# Patient Record
Sex: FEMALE | Race: BLACK OR AFRICAN AMERICAN | Employment: PART TIME | URBAN - METROPOLITAN AREA
[De-identification: names, ages, dates, MRNs, and addresses within clinical notes are randomized per-mention and may not be internally consistent; named-entity substitution may affect disease eponyms.]

---

## 2017-03-30 PROBLEM — O09.529 AMA (ADVANCED MATERNAL AGE) MULTIGRAVIDA 35+: Status: ACTIVE | Noted: 2017-03-30

## 2017-03-30 PROBLEM — Z64.1 MULTIGRAVIDA: Status: ACTIVE | Noted: 2017-03-30

## 2017-05-30 PROBLEM — O26.899 RH NEGATIVE STATE IN ANTEPARTUM PERIOD: Status: ACTIVE | Noted: 2017-05-30

## 2017-05-30 PROBLEM — Z67.91 RH NEGATIVE STATE IN ANTEPARTUM PERIOD: Status: ACTIVE | Noted: 2017-05-30

## 2017-06-02 ENCOUNTER — HOSPITAL ENCOUNTER (OUTPATIENT)
Dept: LAB | Age: 37
Discharge: HOME | End: 2017-06-02
Payer: COMMERCIAL

## 2017-06-02 DIAGNOSIS — Z67.91 RH NEGATIVE STATE IN ANTEPARTUM PERIOD, SECOND TRIMESTER: ICD-10-CM

## 2017-06-02 DIAGNOSIS — O26.892 RH NEGATIVE STATE IN ANTEPARTUM PERIOD, SECOND TRIMESTER: ICD-10-CM

## 2017-06-02 DIAGNOSIS — O99.810 ABNORMAL GLUCOSE AFFECTING PREGNANCY: ICD-10-CM

## 2017-06-02 LAB
BLOOD GROUP ANTIBODIES SERPL: NORMAL
GLUCOSE P FAST SERPL-MCNC: 89 MG/DL
GLUCOSE, 1 HR,GTT1: 123 MG/DL
GLUCOSE, 2 HR,GTT2: 166 MG/DL
GLUCOSE, 3 HR,GTT3: 160 MG/DL

## 2017-06-02 PROCEDURE — 82951 GLUCOSE TOLERANCE TEST (GTT): CPT | Performed by: OBSTETRICS & GYNECOLOGY

## 2017-06-02 PROCEDURE — 82947 ASSAY GLUCOSE BLOOD QUANT: CPT | Performed by: OBSTETRICS & GYNECOLOGY

## 2017-06-02 PROCEDURE — 86850 RBC ANTIBODY SCREEN: CPT | Performed by: OBSTETRICS & GYNECOLOGY

## 2017-06-02 PROCEDURE — 36415 COLL VENOUS BLD VENIPUNCTURE: CPT | Performed by: OBSTETRICS & GYNECOLOGY

## 2017-06-02 PROCEDURE — 82952 GTT-ADDED SAMPLES: CPT | Performed by: OBSTETRICS & GYNECOLOGY

## 2017-06-13 ENCOUNTER — HOSPITAL ENCOUNTER (EMERGENCY)
Age: 37
Discharge: HOME | End: 2017-06-13
Attending: OBSTETRICS & GYNECOLOGY | Admitting: OBSTETRICS & GYNECOLOGY
Payer: COMMERCIAL

## 2017-06-13 PROCEDURE — 74011250636 HC RX REV CODE- 250/636: Performed by: OBSTETRICS & GYNECOLOGY

## 2017-06-13 PROCEDURE — 96372 THER/PROPH/DIAG INJ SC/IM: CPT

## 2017-06-13 RX ADMIN — RHO(D) IMMUNE GLOBULIN (HUMAN) 0.3 MG: 1500 SOLUTION INTRAMUSCULAR at 09:59

## 2017-08-03 ENCOUNTER — HOSPITAL ENCOUNTER (INPATIENT)
Age: 37
LOS: 2 days | Discharge: HOME | End: 2017-08-05
Attending: OBSTETRICS & GYNECOLOGY | Admitting: OBSTETRICS & GYNECOLOGY
Payer: COMMERCIAL

## 2017-08-03 PROBLEM — Z37.9 NORMAL LABOR: Status: ACTIVE | Noted: 2017-08-03

## 2017-08-03 PROBLEM — O42.90 SPROM (PROLONGED SPONTANEOUS RUPTURE OF MEMBRANES): Status: ACTIVE | Noted: 2017-08-03

## 2017-08-03 LAB
ABO + RH BLD: NORMAL
BASE DEFICIT BLDCOV-SCNC: 0.7 MMOL/L (ref 1.9–7.7)
BASE EXCESS BLDCOA CALC-SCNC: 0.1 MMOL/L (ref 0–3)
BDY SITE: ABNORMAL
BDY SITE: ABNORMAL
BLOOD GROUP ANTIBODIES SERPL: NORMAL
ERYTHROCYTE [DISTWIDTH] IN BLOOD BY AUTOMATED COUNT: 15.6 % (ref 11.9–14.6)
HCO3 BLDCOA-SCNC: 27 MMOL/L (ref 22–26)
HCO3 BLDV-SCNC: 24 MMOL/L
HCT VFR BLD AUTO: 35.1 % (ref 35.8–46.3)
HGB BLD-MCNC: 11.9 G/DL (ref 11.7–15.4)
MCH RBC QN AUTO: 26.4 PG (ref 26.1–32.9)
MCHC RBC AUTO-ENTMCNC: 33.9 G/DL (ref 31.4–35)
MCV RBC AUTO: 78 FL (ref 79.6–97.8)
PCO2 BLDCOA: 51 MMHG (ref 33–49)
PCO2 BLDCOV: 41 MMHG (ref 14.1–43.3)
PH BLDCOA: 7.34 [PH] (ref 7.21–7.31)
PH BLDCOV: 7.39 [PH] (ref 7.2–7.44)
PLATELET # BLD AUTO: 211 K/UL (ref 150–450)
PMV BLD AUTO: 11.5 FL (ref 10.8–14.1)
PO2 BLDCOA: 18 MMHG (ref 9–19)
PO2 BLDV: 26 MMHG (ref 30.4–57.2)
RBC # BLD AUTO: 4.5 M/UL (ref 4.05–5.25)
SERVICE CMNT-IMP: ABNORMAL
SERVICE CMNT-IMP: ABNORMAL
SPECIMEN EXP DATE BLD: NORMAL
WBC # BLD AUTO: 6.1 K/UL (ref 4.3–11.1)

## 2017-08-03 PROCEDURE — 82803 BLOOD GASES ANY COMBINATION: CPT

## 2017-08-03 PROCEDURE — 4A1HXCZ MONITORING OF PRODUCTS OF CONCEPTION, CARDIAC RATE, EXTERNAL APPROACH: ICD-10-PCS | Performed by: OBSTETRICS & GYNECOLOGY

## 2017-08-03 PROCEDURE — 74011250637 HC RX REV CODE- 250/637: Performed by: OBSTETRICS & GYNECOLOGY

## 2017-08-03 PROCEDURE — 74011250636 HC RX REV CODE- 250/636: Performed by: OBSTETRICS & GYNECOLOGY

## 2017-08-03 PROCEDURE — 86900 BLOOD TYPING SEROLOGIC ABO: CPT | Performed by: OBSTETRICS & GYNECOLOGY

## 2017-08-03 PROCEDURE — 74011258636 HC RX REV CODE- 258/636: Performed by: OBSTETRICS & GYNECOLOGY

## 2017-08-03 PROCEDURE — 74011000258 HC RX REV CODE- 258: Performed by: OBSTETRICS & GYNECOLOGY

## 2017-08-03 PROCEDURE — 3E033VJ INTRODUCTION OF OTHER HORMONE INTO PERIPHERAL VEIN, PERCUTANEOUS APPROACH: ICD-10-PCS | Performed by: OBSTETRICS & GYNECOLOGY

## 2017-08-03 PROCEDURE — 75410000002 HC LABOR FEE PER 1 HR

## 2017-08-03 PROCEDURE — 75410000003 HC RECOV DEL/VAG/CSECN EA 0.5 HR

## 2017-08-03 PROCEDURE — 85027 COMPLETE CBC AUTOMATED: CPT | Performed by: OBSTETRICS & GYNECOLOGY

## 2017-08-03 PROCEDURE — 65270000029 HC RM PRIVATE

## 2017-08-03 PROCEDURE — 75410000000 HC DELIVERY VAGINAL/SINGLE

## 2017-08-03 RX ORDER — SODIUM CHLORIDE 0.9 % (FLUSH) 0.9 %
5-10 SYRINGE (ML) INJECTION AS NEEDED
Status: DISCONTINUED | OUTPATIENT
Start: 2017-08-03 | End: 2017-08-03

## 2017-08-03 RX ORDER — LIDOCAINE HYDROCHLORIDE 20 MG/ML
JELLY TOPICAL
Status: DISCONTINUED | OUTPATIENT
Start: 2017-08-03 | End: 2017-08-03 | Stop reason: HOSPADM

## 2017-08-03 RX ORDER — SIMETHICONE 80 MG
80 TABLET,CHEWABLE ORAL
Status: DISCONTINUED | OUTPATIENT
Start: 2017-08-03 | End: 2017-08-05 | Stop reason: HOSPADM

## 2017-08-03 RX ORDER — OXYTOCIN/RINGER'S LACTATE 15/250 ML
250 PLASTIC BAG, INJECTION (ML) INTRAVENOUS ONCE
Status: DISCONTINUED | OUTPATIENT
Start: 2017-08-03 | End: 2017-08-03

## 2017-08-03 RX ORDER — BETAMETHASONE SODIUM PHOSPHATE AND BETAMETHASONE ACETATE 3; 3 MG/ML; MG/ML
12 INJECTION, SUSPENSION INTRA-ARTICULAR; INTRALESIONAL; INTRAMUSCULAR; SOFT TISSUE EVERY 24 HOURS
Status: DISCONTINUED | OUTPATIENT
Start: 2017-08-03 | End: 2017-08-03

## 2017-08-03 RX ORDER — LIDOCAINE HYDROCHLORIDE 10 MG/ML
1 INJECTION INFILTRATION; PERINEURAL
Status: DISCONTINUED | OUTPATIENT
Start: 2017-08-03 | End: 2017-08-03 | Stop reason: HOSPADM

## 2017-08-03 RX ORDER — OXYTOCIN/RINGER'S LACTATE 15/250 ML
250 PLASTIC BAG, INJECTION (ML) INTRAVENOUS ONCE
Status: ACTIVE | OUTPATIENT
Start: 2017-08-03 | End: 2017-08-04

## 2017-08-03 RX ORDER — PRENATAL VIT 96/IRON FUM/FOLIC 27MG-0.8MG
1 TABLET ORAL DAILY
Status: DISCONTINUED | OUTPATIENT
Start: 2017-08-04 | End: 2017-08-05 | Stop reason: HOSPADM

## 2017-08-03 RX ORDER — SODIUM CHLORIDE 0.9 % (FLUSH) 0.9 %
5-10 SYRINGE (ML) INJECTION EVERY 8 HOURS
Status: DISCONTINUED | OUTPATIENT
Start: 2017-08-03 | End: 2017-08-03

## 2017-08-03 RX ORDER — LANOLIN ALCOHOL/MO/W.PET/CERES
1 CREAM (GRAM) TOPICAL
Status: DISCONTINUED | OUTPATIENT
Start: 2017-08-04 | End: 2017-08-05 | Stop reason: HOSPADM

## 2017-08-03 RX ORDER — DEXTROSE, SODIUM CHLORIDE, SODIUM LACTATE, POTASSIUM CHLORIDE, AND CALCIUM CHLORIDE 5; .6; .31; .03; .02 G/100ML; G/100ML; G/100ML; G/100ML; G/100ML
125 INJECTION, SOLUTION INTRAVENOUS CONTINUOUS
Status: DISCONTINUED | OUTPATIENT
Start: 2017-08-03 | End: 2017-08-03

## 2017-08-03 RX ORDER — IBUPROFEN 800 MG/1
800 TABLET ORAL
Status: DISCONTINUED | OUTPATIENT
Start: 2017-08-03 | End: 2017-08-05 | Stop reason: HOSPADM

## 2017-08-03 RX ORDER — OXYTOCIN/RINGER'S LACTATE 30/500 ML
1-25 PLASTIC BAG, INJECTION (ML) INTRAVENOUS
Status: DISCONTINUED | OUTPATIENT
Start: 2017-08-03 | End: 2017-08-03

## 2017-08-03 RX ORDER — BUTORPHANOL TARTRATE 1 MG/ML
1 INJECTION INTRAMUSCULAR; INTRAVENOUS
Status: DISCONTINUED | OUTPATIENT
Start: 2017-08-03 | End: 2017-08-03

## 2017-08-03 RX ORDER — MINERAL OIL
120 OIL (ML) ORAL
Status: DISCONTINUED | OUTPATIENT
Start: 2017-08-03 | End: 2017-08-03 | Stop reason: HOSPADM

## 2017-08-03 RX ADMIN — BETAMETHASONE SODIUM PHOSPHATE AND BETAMETHASONE ACETATE 12 MG: 3; 3 INJECTION, SUSPENSION INTRA-ARTICULAR; INTRALESIONAL; INTRAMUSCULAR at 10:42

## 2017-08-03 RX ADMIN — SODIUM CHLORIDE 5 MILLION UNITS: 900 INJECTION, SOLUTION INTRAVENOUS at 10:40

## 2017-08-03 RX ADMIN — OXYTOCIN 2 MILLI-UNITS/MIN: 10 INJECTION, SOLUTION INTRAMUSCULAR; INTRAVENOUS at 11:43

## 2017-08-03 RX ADMIN — SODIUM CHLORIDE, SODIUM LACTATE, POTASSIUM CHLORIDE, CALCIUM CHLORIDE, AND DEXTROSE MONOHYDRATE 125 ML/HR: 600; 310; 30; 20; 5 INJECTION, SOLUTION INTRAVENOUS at 10:42

## 2017-08-03 RX ADMIN — IBUPROFEN 800 MG: 800 TABLET, FILM COATED ORAL at 18:08

## 2017-08-03 NOTE — L&D DELIVERY NOTE
Delivery Summary    Patient: Chaim Ridley MRN: [id number]  SSN: [id number]    YOB: 1980  Age: 42 y.o. Sex: female       Information for the patient's :  Alonzo Langley [[id number]]       Labor Events:    Labor: Yes   Rupture Date: 8/3/2017   Rupture Time: 6:00 AM   Rupture Type PROM   Amniotic Fluid Volume: Moderate    Amniotic Fluid Description: Clear None   Induction: Oxytocin       Augmentation:     Labor Events:       Cervical Ripening:     None     Delivery Events:  Episiotomy: None   Laceration(s): None     Repaired: None    Number of Repair Packets:     Suture Type and Size: None     Estimated Blood Loss (ml): 400ml       Delivery Date: 8/3/2017    Delivery Time: 4:43 PM  Delivery Type: Vaginal, Spontaneous Delivery  Sex:  Male     Gestational Age: 30w2d   Delivery Clinician:  Luis Carlos Lockwood  Living Status: Living   Delivery Location: L&D            APGARS  One minute Five minutes Ten minutes   Skin color: 0   1        Heart rate: 2   2        Grimace: 2   2        Muscle tone: 1   2        Breathin   2        Totals: 7   9            Presentation: Vertex    Position:        Resuscitation Method:  Suctioning-bulb; Tactile Stimulation     Meconium Stained: None      Cord Vessels: 3 Vessels      Cord Events:    Cord Blood Sent?:  Yes    Blood Gases Sent?:  Yes    Placenta:  Date/Time: 8/3  4:51 PM  Removal: Spontaneous      Appearance: Intact; Normal     [place] Measurements:  Birth Weight: 4 lb 14 oz (2.21 kg)      Birth Length: 44.5 cm      Head Circumference: 31 cm      Chest Circumference: 28 cm     Abdominal Girth:       Other Providers:   Kevin CARTER;Cliff DICKENS, Obstetrician;Primary Nurse;Primary [place] Nurse;Neonatologist           Group B Strep:   Lab Results   Component Value Date/Time    Tyler External neg 2012     Information for the patient's :  Alonzo Langley [[id number]]       Lab Results Component Value Date/Time    APH 7.340 (H) 08/03/2017 04:43 PM    APCO2 51 (H) 08/03/2017 04:43 PM    APO2 18 08/03/2017 04:43 PM    AHCO3 27 (H) 08/03/2017 04:43 PM    ABEC 0.1 08/03/2017 04:43 PM    EPHV 7.391 08/03/2017 04:43 PM    PCO2V 41 08/03/2017 04:43 PM    PO2V 26 (L) 08/03/2017 04:43 PM    HCO3V 24 08/03/2017 04:43 PM    EBDV 0.7 (L) 08/03/2017 04:43 PM    SITE CORD 08/03/2017 04:43 PM    SITE CORD 08/03/2017 04:43 PM    RSCOM na at 8 3 2017 5 08 26 PM. Not read back. 08/03/2017 04:43 PM    RSCOM na at 8 3 2017 5 14 19 PM. Not read back.  08/03/2017 04:43 PM

## 2017-08-03 NOTE — PROGRESS NOTES
Patient called out   Needs to push     Complete +2    Called out for Dr. Ml Scott     Patient pushing   [phone] Dr. Cecil Dos Santos to room     1643 Spontaneous delivery of male infant  By RN and Neonatologist    6014 Dr. Ml Scott to room     1651 Delivery of placenta IV Pitocin infusing

## 2017-08-03 NOTE — IP AVS SNAPSHOT
Arlyss Drought 
 
 
 [address] [place] [id number] [facility] [address] 
[phone] Patient: Natasha Ordoñez MRN: [id number] :1980 You are allergic to the following No active allergies Immunizations Administered for This Admission Name Date Rho(D) Immune Globulin - IM 2017 Recent Documentation Height Weight Breastfeeding? BMI OB Status Smoking Status 1.651 m 79.4 kg Yes 29.12 kg/m2 Recent pregnancy Never Smoker Emergency Contacts Name Discharge Info Relation Home Work Mobile Molly Larsen  Spouse [3] [phone] About your hospitalization You were admitted on:  August 3, 2017 You last received care in the:  [address] [facility] You were discharged on:  2017 Unit phone number:  [phone] Why you were hospitalized Your primary diagnosis was:  Sprom (Prolonged Spontaneous Rupture Of Membranes) Your diagnoses also included:  [place] (Advanced Maternal Age) Multigravida 35+, Multigravida, Rh Negative State In Antepartum Period Providers Seen During Your Hospitalizations Provider Role Specialty Primary office phone Veronica Paz MD Attending Provider Obstetrics & Gynecology [phone] Your Primary Care Physician (PCP) Primary Care Physician Office Phone Office Fax [place], [address] [phone] Follow-up Information Follow up With Details Comments Contact Info Devin Goddard MD   [place] [address] [address] [facility]  
[phone] Veronica Paz MD In 6 weeks Follow up with Dr. Qi Olivera in 6 weeks. call his office to make an appointment [address]  [address] [phone] Current Discharge Medication List  
  
START taking these medications Dose & Instructions Dispensing Information Comments Morning Noon Evening Bedtime ibuprofen 800 mg tablet Commonly known as:  MOTRIN Your last dose was: Your next dose is:    
   
   
 Dose:  800 mg Take 1 Tab by mouth every eight (8) hours as needed. Quantity:  30 Tab Refills:  0 CONTINUE these medications which have NOT CHANGED Dose & Instructions Dispensing Information Comments Morning Noon Evening Bedtime NOVA PRENATAL 28 mg iron- 800 mcg Tab Generic drug:  prenatal vit-iron fumarate-fa Your last dose was: Your next dose is: Take  by mouth. Refills:  0 Where to Get Your Medications These medications were sent to [facility]/pharmacy #[id number] [facility], [address] [facility]  [address] [facility], [place] [address] Phone:  [phone]  
  ibuprofen 800 mg tablet Discharge Instructions Postpartum: Care Instructions Your Care Instructions After childbirth (postpartum period), your body goes through many changes. Some of these changes happen over several weeks. In the hours after delivery, your body will begin to recover from childbirth while it prepares to breastfeed your . You may feel emotional during this time. Your hormones can shift your mood without warning for no clear reason. In the first couple of weeks after childbirth, many women have emotions that change from happy to sad. You may find it hard to sleep. You may cry a lot. This is called the \"baby blues. \" These overwhelming emotions often go away within a couple of days or weeks. But it's important to discuss your feelings with your doctor. It is easy to get too tired and overwhelmed during the first weeks after childbirth. Don't try to do too much. Get rest whenever you can, accept help from others, and eat well and drink plenty of fluids.  
About 4 to 6 weeks after your baby's birth, you will have a follow-up visit with your doctor. This visit is your time to talk to your doctor about anything you are concerned or curious about. Follow-up care is a key part of your treatment and safety. Be sure to make and go to all appointments, and call your doctor if you are having problems. It's also a good idea to know your test results and keep a list of the medicines you take. How can you care for yourself at home? · Sleep or rest when your baby sleeps. · Get help with household chores from family or friends, if you can. Do not try to do it all yourself. · If you have hemorrhoids or swelling or pain around the opening of your vagina, try using cold and heat. You can put ice or a cold pack on the area for 10 to 20 minutes at a time. Put a thin cloth between the ice and your skin. Also try sitting in a few inches of warm water (sitz bath) 3 times a day and after bowel movements. · Take pain medicines exactly as directed. ¨ If the doctor gave you a prescription medicine for pain, take it as prescribed. ¨ If you are not taking a prescription pain medicine, ask your doctor if you can take an over-the-counter medicine. · Eat more fiber to avoid constipation. Include foods such as whole-grain breads and cereals, raw vegetables, raw and dried fruits, and beans. · Drink plenty of fluids, enough so that your urine is light yellow or clear like water. If you have kidney, heart, or liver disease and have to limit fluids, talk with your doctor before you increase the amount of fluids you drink. · Do not rinse inside your vagina with fluids (douche). · If you have stitches, keep the area clean by pouring or spraying warm water over the area outside your vagina and anus after you use the toilet. · Keep a list of questions to bring to your postpartum visit. Your questions might be about: 
¨ Changes in your breasts, such as lumps or soreness. ¨ When to expect your menstrual period to start again. ¨ What form of birth control is best for you. ¨ Weight you have put on during the pregnancy. ¨ Exercise options. ¨ What foods and drinks are best for you, especially if you are breastfeeding. ¨ Problems you might be having with breastfeeding. ¨ When you can have sex. Some women may want to talk about lubricants for the vagina. ¨ Any feelings of sadness or restlessness that you are having. When should you call for help? Call 911 anytime you think you may need emergency care. For example, call if: 
· You have thoughts of harming yourself, your baby, or another person. · You passed out (lost consciousness). Call your doctor now or seek immediate medical care if: 
· Your vaginal bleeding seems to be getting heavier. · You are dizzy or lightheaded, or you feel like you may faint. · You have a fever. Watch closely for changes in your health, and be sure to contact your doctor if: 
· You have new or worse vaginal discharge. · You feel sad or depressed. · You are having problems with your breasts or breastfeeding. Where can you learn more? Go to http://prabhu-jacinto.info/. Enter V766 in the search box to learn more about \"Postpartum: Care Instructions. \" Current as of: March 16, 2017 Content Version: 11.3 © 1767-9957 [employer]. Care instructions adapted under license by [employer] (which disclaims liability or warranty for this information). If you have questions about a medical condition or this instruction, always ask your healthcare professional. [place] [address] any warranty or liability for your use of this information. Discharge Orders None [facility] Announcement We are excited to announce that we are making your provider's discharge notes available to you in [employer].   You will see these notes when they are completed and signed by the physician that discharged you from your recent hospital stay. If you have any questions or concerns about any information you see in Indeed, please call the Health Information Department where you were seen or reach out to your Primary Care Provider for more information about your plan of care. Introducing [facility]! Christ Merchant introduces Indeed patient portal. Now you can access parts of your medical record, email your doctor's office, and request medication refills online. 1. In your internet browser, go to [email or web address] 2. Click on the First Time User? Click Here link in the Sign In box. You will see the New Member Sign Up page. 3. Enter your Indeed Access Code exactly as it appears below. You will not need to use this code after youve completed the sign-up process. If you do not sign up before the expiration date, you must request a new code. · Indeed Access Code: [email or web address] Expires: 10/4/2017 11:02 AM 
 
4. Enter the last four digits of your Social Security Number (xxxx) and Date of Birth (mm/dd/yyyy) as indicated and click Submit. You will be taken to the next sign-up page. 5. Create a Indeed ID. This will be your Indeed login ID and cannot be changed, so think of one that is secure and easy to remember. 6. Create a Indeed password. You can change your password at any time. 7. Enter your Password Reset Question and Answer. This can be used at a later time if you forget your password. 8. Enter your e-mail address. You will receive e-mail notification when new information is available in [address] 9. Click Sign Up. You can now view and download portions of your medical record. 10. Click the Download Summary menu link to download a portable copy of your medical information. If you have questions, please visit the Frequently Asked Questions section of the Indeed website. Remember, Indeed is NOT to be used for urgent needs. For medical emergencies, dial 911. Now available from your iPhone and Android! General Information Please provide this summary of care documentation to your next provider. Patient Signature:  ____________________________________________________________ Date:  ____________________________________________________________  
  
Jhoana Hernan Provider Signature:  ____________________________________________________________ Date:  ____________________________________________________________

## 2017-08-03 NOTE — PROCEDURES
Delivery Note  Brief Delivery Note   A 42yo multigravida, Ms. César Hutchinson presented today and was admitted for [facility] and PTL. Mom's prenatal results were  Blood Type: A-  GBS Status: Negative  Hep B Status: Negative  RPR/VDRL Status: Negative  Rubella Status: Immune  HIV Status: Negative   Delivery: I was present with Nursing in the DR from 16:40 onward. The labor and descent progressed preciptiously. The Mother progressed to  and then delivering the head, at which point a tight double nuchal cord was noted. Unable to reduce the cord, the rest of the Baby, being  and relatively small delivered at 16:43. The cord was then crossed clamped x 2 and cut. Dried, the baby was transferred to the radiant warmer for further stabilization. The Mother's perineum appeared intact at delivery. There was no gross bleeding. Dr. Clayton Lugo soon arrived to deliver the placenta. Venous cord blood gas was pH 7.39, pCO2 41, HCO3 24, BE -0.7. Arterial cord blood gas was pH 7.34, pCO2 51, HCO3 18, BE 0.1  Needing stimulation and suctioning only, the  had Apgar's of 7 and 9 and one and five minutes respectively, and was admitted to the  Nursery. The male  weighed 2210 grams (4 pounds, 14 ounces), length 17.52 in. Infant  - Baby A  Delivery Date: 17  Delivery Time: 16:43  Birth Weight: 4 lb 14 oz  Birth Length: 17.5 in  Sex: Male  APGAR 1 Minute: 7  APGAR 5 Minutes: 9  Shoulder Dystocia: No  Cord: 3 vessel  Cord Gases Done: Yes  Placenta: Spontaneous  Outcome: Liveborn   VTE Risk  Receiving LMWH or UFH as Outpatient: No  Pre-Preg. BMI Greater than or Equal to 40: No  Pre-Preg. BMI Greater than or Equal to 30: No  Any History of VTE: No  Thrombophilia AND Family History of VTE: No   Delivery: No  Hemorrhage: No  Hysterectomy: No  General Anesthesia: No  Postpartum Infection: No  Age Greater than 40 or Less than 15 Years: No  Bed Rest: No  Any Thrombophilia: No  Medical Conditions: No  Preg. Complications: No  VTE Risk Score: 0  Risk Level: Low

## 2017-08-03 NOTE — H&P
History & Physical    Name: Ashley Solo MRN: [id number]  SSN: [id number]    YOB: 1980  Age: 42 y.o. Sex: female      Subjective:     SPROM    Estimated Date of Delivery: 17  OB History    Para Term  AB Living   5 4 4   4   SAB TAB Ectopic Molar Multiple Live Births        4      # Outcome Date GA Lbr Hany/2nd Weight Sex Delivery Anes PTL Lv   5 Current            4 Term 12 38w3d  6 lb 7.4 oz (2.93 kg) F VAGINAL DELI None N NAZARIO   3 Term 2010   6 lb 10 oz (3.005 kg) M VAGINAL DELI  N NAZARIO   2 Term 2002   6 lb 9 oz (2.977 kg) M VAGINAL DELI  N NAZARIO   1 Term 1996   6 lb 7 oz (2.92 kg) M PVD  N NAZARIO          Ms. Isabel Mcpherson is admitted with pregnancy at 35w3d for induction of labor due to spontaneous rupture of membranes. Prenatal course was complicated by advanced maternal age. Please see prenatal records for details. Past Medical History:   Diagnosis Date    Hx of abnormal Pap smear 15 yrs ago    Hx of pregnancy 96,10,12.02    Vaginal Deliveries    Sickle cell trait syndrome ([facility] [address]      History reviewed. No pertinent surgical history. Social History     Occupational History    Not on file.      Social History Main Topics    Smoking status: Never Smoker    Smokeless tobacco: Never Used    Alcohol use No    Drug use: No    Sexual activity: Yes     Partners: Male     Birth control/ protection: None     Family History   Problem Relation Age of Onset    Diabetes Maternal Grandmother     Heart Disease Maternal Grandmother     Hypertension Maternal Grandmother     Lung Disease Maternal Grandmother     Cancer Maternal Grandmother      lung    Thyroid Disease Maternal Grandmother     COPD Maternal Grandmother     Hypertension Mother     Diabetes Mother     Cancer Paternal Grandmother      breast    Alcohol abuse Neg Hx     Arthritis-osteo Neg Hx     Asthma Neg Hx     Bleeding Prob Neg Hx     Stroke Neg Hx     Elevated Lipids Neg Hx     Headache Neg Hx     Migraines Neg Hx     Mental Retardation Neg Hx     Psychiatric Disorder Neg Hx        No Known Allergies  Prior to Admission medications    Medication Sig Start Date End Date Taking? Authorizing Provider   prenatal vit-iron fumarate-fa (NOVA PRENATAL) 28-0.8 mg Tab Take  by mouth. Historical Provider        Review of Systems: Pertinent items are noted in the History of Present Illness. Objective:       Physical Exam:  Patient without distress. Heart: Regular rate and rhythm  Lung: clear to auscultation throughout lung fields, no wheezes, no rales, no rhonchi and normal respiratory effort  Back: costovertebral angle tenderness absent  Abdomen: soft, nontender  Fundus: soft and non tender  Perineum: blood absent, amniotic fluid present  Cervical Exam: FT/Thick/High  Lower Extremities:  - Edema 1+   - No evidence of DVT seen on physical exam.  Membranes:   Premature Rupture of Membranes; Amniotic Fluid: clear fluid      Prenatal Labs:   Lab Results   Component Value Date/Time    ABO/Rh(D) A NEGATIVE 2017 10:25 AM    Rubella, External 21.90 2017    HBsAg, External Negative 2017    HIV, External N.R. 2017    RPR, External N.R. 2017    Gonorrhea, External neg 2012    Gonorrhea, External neg 2012    Chlamydia, External neg 2012    Chlamydia, External neg 2012    ABO,Rh A- Negative 2017    GrBStrep, External neg 2012       Impression/Plan:     Principal Problem:    SPROM (prolonged spontaneous rupture of membranes) (8/3/2017)    Active Problems:    AMA (advanced maternal age) multigravida 35+ (3/30/2017)      Multigravida (3/30/2017)      Rh negative state in antepartum period (2017)         Plan: Admit for induction of labor. Group B Strep positive, will treat prophylactically with penicillin.     Signed By:  Roger Olvera MD     August 3, 2017

## 2017-08-03 NOTE — IP AVS SNAPSHOT
[address] 
[phone] Patient: Elias Alexandre MRN: [id number] :1980 Current Discharge Medication List  
  
START taking these medications Dose & Instructions Dispensing Information Comments Morning Noon Evening Bedtime  
 ibuprofen 800 mg tablet Commonly known as:  MOTRIN Your last dose was: Your next dose is:    
   
   
 Dose:  800 mg Take 1 Tab by mouth every eight (8) hours as needed. Quantity:  30 Tab Refills:  0 CONTINUE these medications which have NOT CHANGED Dose & Instructions Dispensing Information Comments Morning Noon Evening Bedtime NOVA PRENATAL 28 mg iron- 800 mcg Tab Generic drug:  prenatal vit-iron fumarate-fa Your last dose was: Your next dose is: Take  by mouth. Refills:  0 Where to Get Your Medications These medications were sent to [facility]/pharmacy #[id number]- [id number] [facility], [address] [place] [address]  [address], [place] [address] Phone:  [phone]  
  ibuprofen 800 mg tablet

## 2017-08-04 LAB
BLOOD BANK CMNT PATIENT-IMP: NORMAL
FETAL SCREEN,FMHS: NORMAL

## 2017-08-04 PROCEDURE — 65270000029 HC RM PRIVATE

## 2017-08-04 PROCEDURE — 74011250637 HC RX REV CODE- 250/637: Performed by: OBSTETRICS & GYNECOLOGY

## 2017-08-04 PROCEDURE — 36415 COLL VENOUS BLD VENIPUNCTURE: CPT | Performed by: OBSTETRICS & GYNECOLOGY

## 2017-08-04 PROCEDURE — 74011250636 HC RX REV CODE- 250/636: Performed by: OBSTETRICS & GYNECOLOGY

## 2017-08-04 PROCEDURE — 85461 HEMOGLOBIN FETAL: CPT | Performed by: OBSTETRICS & GYNECOLOGY

## 2017-08-04 RX ORDER — IBUPROFEN 800 MG/1
800 TABLET ORAL
Qty: 30 TAB | Refills: 0 | Status: SHIPPED | OUTPATIENT
Start: 2017-08-04 | End: 2017-08-17

## 2017-08-04 RX ADMIN — PRENATAL VIT W/ FE FUMARATE-FA TAB 27-0.8 MG 1 TABLET: 27-0.8 TAB at 09:04

## 2017-08-04 RX ADMIN — RHO(D) IMMUNE GLOBULIN (HUMAN) 0.3 MG: 1500 SOLUTION INTRAMUSCULAR at 09:21

## 2017-08-04 RX ADMIN — FERROUS SULFATE TAB 325 MG (65 MG ELEMENTAL FE) 325 MG: 325 (65 FE) TAB at 09:04

## 2017-08-04 NOTE — PROGRESS NOTES
Instructed on curved tip syring and slow flow nipples. Pt instructed to call our prior to next feeding for a demonstration on how to use the curved tip syring.

## 2017-08-04 NOTE — PROGRESS NOTES
SBAR OUT Report: Mother    Verbal report given to Candice Milan RN (full name & credentials) on this patient, who is now being transferred to [address] [place] [address] (unit) for routine progression of care. The patient is not wearing a green \"Anesthesia-Duramorph\" band. Report consisted of patient's Situation, Background, Assessment and Recommendations (SBAR). [place] ID bands were compared with the identification form, and verified with the patient and receiving nurse. Information from the SBAR, Procedure Summary, Intake/Output, MAR and Recent Results and the [place] Report was reviewed with the receiving nurse; opportunity for questions and clarification provided.

## 2017-08-04 NOTE — PROGRESS NOTES
Patient given information packet and provided education on TDAP and Hep B vaccines. Patient refused Hep B at this time due to infant's size. Patient educated on Shaken Baby Syndrome, consent signed and placed in the infant's chart. All questions answered. Patient encouraged to call for assistance.

## 2017-08-04 NOTE — LACTATION NOTE
In to see mom and infant for first time. Infant is late . Gave mom a handout and reviewed that with her. Mom has been offering breast. She stated that infant has latched a couple of times but does not maintain the latch. She started pumping this am and expressed drops only. She is supplementing with formula. Also nurse gave mom slow flow nipples to use and also instructed her on how to use curve tip syringe. Instructed mom to call out for assistance as needed.  Otherwise lactation consultant will follow up in am.

## 2017-08-04 NOTE — PROGRESS NOTES
Post-Partum Day Number 1 Progress Note    Patient doing well post-partum without significant complaint. Voiding without difficulty, normal lochia. Vitals:  Patient Vitals for the past 8 hrs:   BP Temp Pulse Resp   17 0740 119/72 98.1 °F (36.7 °C) 84 18     Temp (24hrs), Av.5 °F (36.9 °C), Min:98.1 °F (36.7 °C), Max:99.4 °F (37.4 °C)      Vital signs stable, afebrile. Exam:  Patient without distress. Abdomen soft, fundus firm below level of umbilicus, nontender               Perineum with normal lochia noted. Lower extremities are negative for swelling, cords or tenderness. Lab/Data Review:  Recent Results (from the past 24 hour(s))   RT--CORD BLOOD GAS    Collection Time: 17  4:43 PM   Result Value Ref Range    PH,CORD BLD ARTERIAL 7.340 (H) 7.21 - 7.31      PCO2,CORD BLD ARTERIAL 51 (H) 33 - 49 mmHg    PO2,CORD BLD ARTERIAL 18 9 - 19 mmHg    HCO3,CORD BLD ARTERIAL 27 (H) 22 - 26 mmol/L    BASE EXCESS,CBA 0.1 0.0 - 3.0 mmol/L    SITE CORD     Respiratory comment: na at 8 3 2017 5 08 26 PM. Not read back. CORD BLOOD GAS VENOUS    Collection Time: 17  4:43 PM   Result Value Ref Range    PH,CORD BLD VENOUS 7.391 7.2 - 7.44      PCO2,CORD BLD VENOUS 41 14.1 - 43.3 mmHg    PO2,CORD BLD VENOUS 26 (L) 30.4 - 57.2 mmHg    HCO3,CORD BLD VENOUS 24 mmol/L    BASE DEFICIT,CBV 0.7 (L) 1.9 - 7.7 mmol/L    SITE CORD     Respiratory comment: na at 8 3 2017 5 14 19 PM. Not read back. FETAL SCREEN    Collection Time: 17  5:50 AM   Result Value Ref Range    Fetal screen NEG     Comment IMMUCOR LOT [id number] EXP [id number]        Assessment and Plan:  Patient appears to be having uncomplicated post-partum course. Continue routine perineal care and maternal education. Plan discharge tomorrow if no problems occur.   CBC tomorrow

## 2017-08-04 NOTE — PROGRESS NOTES
Report received from Shantanu Joyce. Plan of care discussed. Care assumed. Pt resting in bed. Baby at bedside.

## 2017-08-04 NOTE — PROGRESS NOTES
Vital signs stable on room air. Fundus is firm at at umbilicus (right) with scant rubra bleeding. Pain assessment completed (see flow sheet). Patient educated on room service and status of diet, verbalized understanding. Stacey-care, ice change, and panties applied. IV capped off. Intake and output completed at this time (see flow sheet). Pt last void at 1900 per Wolf Miner RN. Pt transferred to with assistance X1. Infant and mother transported to [facility] ROOM 462 at this time. Family accompanied with belongings.

## 2017-08-04 NOTE — LACTATION NOTE
This note was copied from a baby's chart. Mother requests formula due to infant not latching. Supplementation consent signed by mother and placed on the infant's chart. Mother educated on feeding the infant formula via bottle with standard flow nipples. Neosure formula and standard flow nipples taken into patient's room per request.  Mother assisted with feeding infant for the first time. All questions answered. Mother encouraged to call for assistance with feeding.

## 2017-08-04 NOTE — PROGRESS NOTES
SBAR IN Report: Mother    Verbal report received from Merary Nugent RN on this patient, who is now being transferred from L&D for routine progression of care. The patient is not wearing a green \"Anesthesia-Duramorph\" band. Report consisted of patient's Situation, Background, Assessment and Recommendations (SBAR). [place] ID bands were compared with the identification form, and verified with the patient and transferring nurse. Information from the SBAR and the [place] Report was reviewed with the transferring nurse; opportunity for questions and clarification provided.

## 2017-08-04 NOTE — LACTATION NOTE

## 2017-08-04 NOTE — PROGRESS NOTES
Assessment complete, see doc flowsheets. Upon fundal assessment, patient's fundus noted to be at U+1 and to the right, small rubra. Patient assisted out of bed and to the restroom. Patient was able to void 600ml. Patient educated on luc-care and was able to demonstrate proper luc-care. Patient assisted back to bed. Upon fundal assessment, patient's fundus noted to be at U and midline, small rubra. Patient repositioned in bed and encouraged to call for assistance.

## 2017-08-04 NOTE — DISCHARGE SUMMARY
Obstetrical Discharge Summary     Name: Daniel Chauhan MRN: [id number]  SSN: [id number]    YOB: 1980  Age: 42 y.o. Sex: female      Admit Date: 8/3/2017    Discharge Date: 2017     Admitting Physician: Catie Moseley MD     Attending Physician:  Catie Moseley MD     * Admission Diagnoses: Normal labor    * Discharge Diagnoses:   Information for the patient's :  Angelina Dailey [[id number]]   Delivery of a 2.21 kg male infant via Vaginal, Spontaneous Delivery on 8/3/2017 at 4:43 PM  by . Apgars were 7 and 9. Additional Diagnoses:   Hospital Problems as of 2017  Date Reviewed: 2017          Codes Class Noted - Resolved POA    * (Principal)SPROM (prolonged spontaneous rupture of membranes) ICD-10-CM: O42.90  ICD-9-CM: 658.20  8/3/2017 - Present Yes        Rh negative state in antepartum period ICD-10-CM: O09.899  ICD-9-CM: V23.89  2017 - Present Yes        AMA (advanced maternal age) multigravida 35+ ICD-10-CM: O09.529  ICD-9-CM: 659.60  3/30/2017 - Present Yes        Multigravida ICD-10-CM: Z64.1  ICD-9-CM: V61.5  3/30/2017 - Present Yes             Lab Results   Component Value Date/Time    ABO/Rh(D) A NEGATIVE 2017 10:25 AM    Rubella, External 21.90 2017    GrBStrep, External neg 2012    ABO,Rh A- Negative 2017      Immunization History   Administered Date(s) Administered    Rho(D) Immune Globulin - IM 2017, 2017    Rhogam Injection 2012       * Procedures: spontaneous vaginal delivery      * Discharge Condition: stable    * Hospital Course: Normal hospital course following the delivery. * Disposition: Home    Discharge Medications:   Current Discharge Medication List      START taking these medications    Details   ibuprofen (MOTRIN) 800 mg tablet Take 1 Tab by mouth every eight (8) hours as needed.   Qty: 30 Tab, Refills: 0         CONTINUE these medications which have NOT CHANGED    Details   prenatal vit-iron fumarate-fa (NOVA PRENATAL) 28-0.8 mg Tab Take  by mouth. * Follow-up Care/Patient Instructions:   Activity: No sex for 6 weeks  Diet: Regular Diet  Wound Care: None needed    Follow-up Information     Follow up With Details Comments Contact Info    Nel Lorenz, [address] [facility]  [phone]             Signed By:  Barbra Melton MD     August 4, 2017

## 2017-08-05 VITALS
HEART RATE: 72 BPM | OXYGEN SATURATION: 98 % | TEMPERATURE: 98.7 F | BODY MASS INDEX: 29.16 KG/M2 | SYSTOLIC BLOOD PRESSURE: 107 MMHG | WEIGHT: 175 LBS | HEIGHT: 65 IN | DIASTOLIC BLOOD PRESSURE: 69 MMHG | RESPIRATION RATE: 18 BRPM

## 2017-08-05 LAB
BASOPHILS # BLD AUTO: 0 K/UL (ref 0–0.2)
BASOPHILS # BLD: 0 % (ref 0–2)
DIFFERENTIAL METHOD BLD: ABNORMAL
EOSINOPHIL # BLD: 0.1 K/UL (ref 0–0.8)
EOSINOPHIL NFR BLD: 1 % (ref 0.5–7.8)
ERYTHROCYTE [DISTWIDTH] IN BLOOD BY AUTOMATED COUNT: 15.4 % (ref 11.9–14.6)
HCT VFR BLD AUTO: 32.1 % (ref 35.8–46.3)
HGB BLD-MCNC: 10.8 G/DL (ref 11.7–15.4)
IMM GRANULOCYTES # BLD: 0.1 K/UL (ref 0–0.5)
IMM GRANULOCYTES NFR BLD AUTO: 0.6 % (ref 0–5)
LYMPHOCYTES # BLD AUTO: 14 % (ref 13–44)
LYMPHOCYTES # BLD: 1.6 K/UL (ref 0.5–4.6)
MCH RBC QN AUTO: 26.3 PG (ref 26.1–32.9)
MCHC RBC AUTO-ENTMCNC: 33.6 G/DL (ref 31.4–35)
MCV RBC AUTO: 78.1 FL (ref 79.6–97.8)
MONOCYTES # BLD: 1 K/UL (ref 0.1–1.3)
MONOCYTES NFR BLD AUTO: 9 % (ref 4–12)
NEUTS SEG # BLD: 8.3 K/UL (ref 1.7–8.2)
NEUTS SEG NFR BLD AUTO: 75 % (ref 43–78)
PLATELET # BLD AUTO: 220 K/UL (ref 150–450)
PMV BLD AUTO: 11.5 FL (ref 10.8–14.1)
RBC # BLD AUTO: 4.11 M/UL (ref 4.05–5.25)
WBC # BLD AUTO: 11 K/UL (ref 4.3–11.1)

## 2017-08-05 PROCEDURE — 85025 COMPLETE CBC W/AUTO DIFF WBC: CPT | Performed by: OBSTETRICS & GYNECOLOGY

## 2017-08-05 PROCEDURE — 74011250637 HC RX REV CODE- 250/637: Performed by: OBSTETRICS & GYNECOLOGY

## 2017-08-05 PROCEDURE — 36415 COLL VENOUS BLD VENIPUNCTURE: CPT | Performed by: OBSTETRICS & GYNECOLOGY

## 2017-08-05 RX ADMIN — PRENATAL VIT W/ FE FUMARATE-FA TAB 27-0.8 MG 1 TABLET: 27-0.8 TAB at 11:47

## 2017-08-05 RX ADMIN — FERROUS SULFATE TAB 325 MG (65 MG ELEMENTAL FE) 325 MG: 325 (65 FE) TAB at 11:48

## 2017-08-05 NOTE — DISCHARGE INSTRUCTIONS
Postpartum: Care Instructions  Your Care Instructions  After childbirth (postpartum period), your body goes through many changes. Some of these changes happen over several weeks. In the hours after delivery, your body will begin to recover from childbirth while it prepares to breastfeed your . You may feel emotional during this time. Your hormones can shift your mood without warning for no clear reason. In the first couple of weeks after childbirth, many women have emotions that change from happy to sad. You may find it hard to sleep. You may cry a lot. This is called the \"baby blues. \" These overwhelming emotions often go away within a couple of days or weeks. But it's important to discuss your feelings with your doctor. It is easy to get too tired and overwhelmed during the first weeks after childbirth. Don't try to do too much. Get rest whenever you can, accept help from others, and eat well and drink plenty of fluids. About 4 to 6 weeks after your baby's birth, you will have a follow-up visit with your doctor. This visit is your time to talk to your doctor about anything you are concerned or curious about. Follow-up care is a key part of your treatment and safety. Be sure to make and go to all appointments, and call your doctor if you are having problems. It's also a good idea to know your test results and keep a list of the medicines you take. How can you care for yourself at home? · Sleep or rest when your baby sleeps. · Get help with household chores from family or friends, if you can. Do not try to do it all yourself. · If you have hemorrhoids or swelling or pain around the opening of your vagina, try using cold and heat. You can put ice or a cold pack on the area for 10 to 20 minutes at a time. Put a thin cloth between the ice and your skin. Also try sitting in a few inches of warm water (sitz bath) 3 times a day and after bowel movements. · Take pain medicines exactly as directed.   ¨ If the doctor gave you a prescription medicine for pain, take it as prescribed. ¨ If you are not taking a prescription pain medicine, ask your doctor if you can take an over-the-counter medicine. · Eat more fiber to avoid constipation. Include foods such as whole-grain breads and cereals, raw vegetables, raw and dried fruits, and beans. · Drink plenty of fluids, enough so that your urine is light yellow or clear like water. If you have kidney, heart, or liver disease and have to limit fluids, talk with your doctor before you increase the amount of fluids you drink. · Do not rinse inside your vagina with fluids (douche). · If you have stitches, keep the area clean by pouring or spraying warm water over the area outside your vagina and anus after you use the toilet. · Keep a list of questions to bring to your postpartum visit. Your questions might be about:  ¨ Changes in your breasts, such as lumps or soreness. ¨ When to expect your menstrual period to start again. ¨ What form of birth control is best for you. ¨ Weight you have put on during the pregnancy. ¨ Exercise options. ¨ What foods and drinks are best for you, especially if you are breastfeeding. ¨ Problems you might be having with breastfeeding. ¨ When you can have sex. Some women may want to talk about lubricants for the vagina. ¨ Any feelings of sadness or restlessness that you are having. When should you call for help? Call 911 anytime you think you may need emergency care. For example, call if:  · You have thoughts of harming yourself, your baby, or another person. · You passed out (lost consciousness). Call your doctor now or seek immediate medical care if:  · Your vaginal bleeding seems to be getting heavier. · You are dizzy or lightheaded, or you feel like you may faint. · You have a fever. Watch closely for changes in your health, and be sure to contact your doctor if:  · You have new or worse vaginal discharge.   · You feel sad or depressed. · You are having problems with your breasts or breastfeeding. Where can you learn more? Go to http://prabhu-jacinto.info/. Enter Q126 in the search box to learn more about \"Postpartum: Care Instructions. \"  Current as of: March 16, 2017  Content Version: 11.3  © 4743-4230 [employer]. Care instructions adapted under license by [employer] (which disclaims liability or warranty for this information). If you have questions about a medical condition or this instruction, always ask your healthcare professional. [address] any warranty or liability for your use of this information.

## 2017-08-05 NOTE — LACTATION NOTE
In to follow up with mom and infant. Mom is basically formula feeding. She occasionally puts infant to breast. She said that he actually latched and nursed for about 15 minutes earlier today. She stated that at this time she has no questions or concerns.  Lactation consultant will follow up in am.

## 2017-08-05 NOTE — PROGRESS NOTES
Patient discharged to the room. Discharge  teaching complete, pt verbalizes understanding; questions encouraged. Patient stable at discharge.

## 2017-08-05 NOTE — PROGRESS NOTES
Report received from Cortney Reese RN, and care assumed. Bedside report completed. Pt denies any needs at present.

## 2017-08-05 NOTE — PROGRESS NOTES
Post-Partum Day Number 2 Progress/Discharge Note    Patient doing well post-partum without significant complaint. Voiding without difficulty, normal lochia, positive flatus. Vitals:  Patient Vitals for the past 8 hrs:   BP Temp Pulse Resp   17 0713 107/69 98.7 °F (37.1 °C) 72 18     Temp (24hrs), Av.3 °F (36.8 °C), Min:98.1 °F (36.7 °C), Max:98.7 °F (37.1 °C)      Vital signs stable, afebrile. Exam:  Patient without distress. Abdomen soft, fundus firm below level of umbilicus, non tender               Perineum with normal lochia noted. Lower extremities are negative for swelling, cords or tenderness. Lab/Data Review:      Assessment and Plan:  Patient appears to be having uncomplicated post-partum course. Continue routine perineal care and maternal education. Plan discharge for today with follow up in our office in 6 weeks.

## 2017-08-05 NOTE — PROGRESS NOTES
Report received from Gogo Bob RN. Plan of care discussed. Care assumed. Pt resting in bed. Family member at bedside.

## 2017-09-22 PROBLEM — O42.90 SPROM (PROLONGED SPONTANEOUS RUPTURE OF MEMBRANES): Status: RESOLVED | Noted: 2017-08-03 | Resolved: 2017-09-22

## 2023-07-25 ENCOUNTER — HOSPITAL ENCOUNTER (EMERGENCY)
Dept: HOSPITAL 15 - ER | Age: 43
Discharge: HOME | End: 2023-07-25
Payer: COMMERCIAL

## 2023-07-25 VITALS
BODY MASS INDEX: 22.7 KG/M2 | WEIGHT: 136.25 LBS | RESPIRATION RATE: 18 BRPM | SYSTOLIC BLOOD PRESSURE: 112 MMHG | TEMPERATURE: 98.3 F | DIASTOLIC BLOOD PRESSURE: 87 MMHG | HEART RATE: 81 BPM | HEIGHT: 65 IN

## 2023-07-25 VITALS — OXYGEN SATURATION: 98 %

## 2023-07-25 DIAGNOSIS — H10.33: Primary | ICD-10-CM
